# Patient Record
Sex: FEMALE | Race: WHITE
[De-identification: names, ages, dates, MRNs, and addresses within clinical notes are randomized per-mention and may not be internally consistent; named-entity substitution may affect disease eponyms.]

---

## 2017-03-03 ENCOUNTER — HOSPITAL ENCOUNTER (OUTPATIENT)
Dept: HOSPITAL 59 - HOP | Age: 67
Discharge: HOME | End: 2017-03-03
Attending: INTERNAL MEDICINE
Payer: MEDICARE

## 2017-03-03 DIAGNOSIS — K29.50: ICD-10-CM

## 2017-03-03 DIAGNOSIS — R63.4: Primary | ICD-10-CM

## 2017-03-03 DIAGNOSIS — K44.9: ICD-10-CM

## 2017-03-03 DIAGNOSIS — I10: ICD-10-CM

## 2017-03-03 DIAGNOSIS — K20.8: ICD-10-CM

## 2017-03-03 LAB
BUN SERPL-MCNC: 23 MG/DL (ref 7–17)
CREAT SERPL-MCNC: 0.8 MG/DL (ref 0.52–1.04)
EST GLOMERULAR FILTRATION RATE: > 60 ML/MIN

## 2017-03-03 PROCEDURE — 82565 ASSAY OF CREATININE: CPT

## 2017-03-03 PROCEDURE — 00740: CPT

## 2017-03-03 PROCEDURE — 84520 ASSAY OF UREA NITROGEN: CPT

## 2017-03-03 PROCEDURE — 43239 EGD BIOPSY SINGLE/MULTIPLE: CPT

## 2017-10-23 ENCOUNTER — HOSPITAL ENCOUNTER (OUTPATIENT)
Dept: HOSPITAL 59 - HOP | Age: 67
Discharge: HOME | End: 2017-10-23
Attending: INTERNAL MEDICINE
Payer: MEDICARE

## 2017-10-23 DIAGNOSIS — K57.30: ICD-10-CM

## 2017-10-23 DIAGNOSIS — I10: ICD-10-CM

## 2017-10-23 DIAGNOSIS — F32.9: ICD-10-CM

## 2017-10-23 DIAGNOSIS — Z12.11: Primary | ICD-10-CM

## 2017-10-23 PROCEDURE — 00810: CPT

## 2017-10-24 NOTE — OPERATIVE NOTE
DATE OF SURGERY:  10/23/2017



REFERRING PHYSICIAN:  Mendel Govea DO



PREOPERATIVE DIAGNOSIS:  See below. 



POSTOPERATIVE DIAGNOSIS:  See below. 



PROCEDURE:  COLONOSCOPY to the cecum. 



INDICATION:  Colorectal cancer screening.  The patient had a colonoscopy some 17 years ago, at which 
time a hyperplastic polyp was removed.  She returns at this time for screening. 



Intravenous sedation was administered by the Department of Anesthesiology and included Diprivan 
titrated to effect. 



PROCEDURE:  Following informed consent from this alert individual, including a discussion of the 
risks and benefits of the procedure and an opportunity for the patient to ask questions, the patient 
was placed in the left lateral decubitus position.  A digital rectal examination was performed.  No 
masses were noted.  No abnormalities detected.  Following this, an Olympus DWZ682 video colonoscope 
was inserted into the rectum without resistance.  The rectal mucosa had a normal appearance, with 
normal folds and distensibility.  The colonoscope was advanced up through the colon to the level of 
the cecum without much difficulty.  There was some spasm noted in the left colon, and for this 
reason 0.25 mg of glucagon was administered intravenously with some relaxation noted. The cecum was 
defined by noting the appendiceal orifice and ileocecal valve.  From the base of the cecum, the 
colonoscope was then withdrawn.  No abnormalities were detected.  A few scattered diverticula were 
seen in the sigmoid colon.  Retroflexion of the rectum was endoscopically normal.  The colon 
preparation was good.  The endoscope was removed.  The patient tolerated the procedure well and was 
returned to the recovery area in stable condition. 



IMPRESSION:   Sigmoid diverticulosis, mild.  Otherwise, unremarkable examination. 



RECOMMENDATIONS:  The patient was advised to have recheck colonoscopy in 10 years' time or sooner 
should problems arise.  Followup will otherwise be with Dr. Mendel Govea. 



As always, thank you for allowing me to participate in the care of your patient. CC: DO ESME Mcgraw

## 2019-03-21 ENCOUNTER — HOSPITAL ENCOUNTER (OUTPATIENT)
Dept: HOSPITAL 59 - SUR | Age: 69
Discharge: HOME | End: 2019-03-21
Attending: OPHTHALMOLOGY
Payer: MEDICARE

## 2019-03-21 DIAGNOSIS — H25.12: Primary | ICD-10-CM

## 2019-03-21 DIAGNOSIS — I10: ICD-10-CM

## 2019-03-23 NOTE — OP NOTE CHAMES
DATE OF PROCEDURE:  03/21/2019.



PREOPERATIVE DIAGNOSIS:  Nuclear sclerotic and cortical cataract, left eye.



POSTOPERATIVE DIAGNOSIS: Nuclear sclerotic and cortical, left eye.



OPERATION:  Phacoemulsification of cataractous lens with implantation of 
intraocular lens.



LENS IMPLANT USED:  [employer] Model PCB00 + 28.0 diopters.



COMPLICATIONS:  None.



PROCEDURE IN DETAIL:  Following a retrobulbar and facial block, the patient was 
prepped and draped in the usual fashion for eye surgery.  A lid speculum was 
placed in the left eye after which a 2.4 mm tunnel wound was placed at the 
temporal limbus and dissected into clear cornea.  A paracentesis was placed at 
2 oclock hours to the left and right of the initial incision and the chamber 
deepened with Viscoelastic.  The keratome was then used to enter the anterior 
chamber after which the continuous circular capsulorrhexis was accomplished 
without difficulty using a bent needle and a Utrata forceps.  Hydrodissection 
and hydrodelineation of the lens was performed after which the nucleus of the 
lens was removed using the Phaco handpiece in the divide-and-conquer technique.
  The residual cortical material was irrigated and aspirated from the eye after 
which the bag and chamber were re-examined.  The bag was re-inflated with 
Viscoelastic and the intraocular lens injected into the capsular bag where it 
centered well.  The Viscoelastic was then copiously irrigated and aspirated 
from the eye after which the temporal tunnel wound and paracentesis were 
hydrated and the wounds were examined.  They were noted to be watertight.   



The lid speculum was removed from the eye and the eye patched and shielded.  
The patient was transferred to the recovery room in satisfactory condition and 
given an appointment to be reexamined in the clinic later today or as directed 
by Dr. Mi.



JOB NUMBER:  [id number]
[facility]

## 2019-04-18 ENCOUNTER — HOSPITAL ENCOUNTER (OUTPATIENT)
Dept: HOSPITAL 59 - SUR | Age: 69
Discharge: HOME | End: 2019-04-18
Attending: OPHTHALMOLOGY
Payer: MEDICARE

## 2019-04-18 DIAGNOSIS — H25.11: Primary | ICD-10-CM

## 2019-04-18 DIAGNOSIS — I10: ICD-10-CM

## 2019-04-18 NOTE — OPERATIVE NOTE
DATE OF PROCEDURE:  04/18/19.



PREOPERATIVE DIAGNOSIS:  Nuclear sclerotic and cortical cataract, right eye.



POSTOPERATIVE DIAGNOSIS: Nuclear sclerotic and cortical cataract, right eye.



OPERATION:  Phacoemulsification of cataractous lens with implantation of 
intraocular lens.



LENS IMPLANT USED:  [employer] Model PCB00 + 26.0 diopters.



COMPLICATIONS:  None.



PROCEDURE IN DETAIL:  Following a retrobulbar and facial block, the patient was 
prepped and draped in the usual fashion for eye surgery.  A lid speculum was 
placed in the right eye after which a 2.4 mm tunnel wound was placed at the 
temporal limbus and dissected into clear cornea.  A paracentesis was placed at 
2 o'clock hours to the left and right of the initial incision and the chamber 
deepened with Viscoelastic.  The keratome was then used to enter the anterior 
chamber after which the continuous circular capsulorrhexis was accomplished 
without difficulty using a bent needle and a Utrata forceps.  Hydrodissection 
and hydrodelineation of the lens was performed after which the nucleus of the 
lens was removed using the Phaco handpiece in the divide-and-conquer technique.
  The residual cortical material was irrigated and aspirated from the eye after 
which the bag and chamber were re-examined.  The bag was re-inflated with 
Viscoelastic and the intraocular lens injected into the capsular bag where it 
centered well.  The Viscoelastic was then copiously irrigated and aspirated 
from the eye after which the temporal tunnel wound and paracentesis were 
hydrated and the wounds were examined.  They were noted to be watertight.   



The lid speculum was removed from the eye and the eye patched and shielded.  
The patient was transferred to the recovery room in satisfactory condition and 
given an appointment to be reexamined in the clinic later today or as directed 
by Dr. Mi.



JOB NUMBER:  [id number]
[facility]